# Patient Record
Sex: FEMALE
[De-identification: names, ages, dates, MRNs, and addresses within clinical notes are randomized per-mention and may not be internally consistent; named-entity substitution may affect disease eponyms.]

---

## 2017-03-30 PROBLEM — M75.102 ROTATOR CUFF SYNDROME, LEFT: Status: ACTIVE | Noted: 2017-03-30

## 2017-04-20 PROBLEM — M25.551 HIP PAIN, RIGHT: Status: ACTIVE | Noted: 2017-04-20

## 2017-06-19 PROBLEM — K44.9 HIATAL HERNIA: Status: ACTIVE | Noted: 2017-06-19

## 2017-06-19 PROBLEM — K21.9 GASTROESOPHAGEAL REFLUX DISEASE WITHOUT ESOPHAGITIS: Status: ACTIVE | Noted: 2017-06-19

## 2018-04-25 PROBLEM — M25.511 ACUTE PAIN OF RIGHT SHOULDER: Status: ACTIVE | Noted: 2018-04-25

## 2018-06-04 PROBLEM — IMO0002 DISORDER OF ROTATOR CUFF SYNDROME OF RIGHT SHOULDER AND ALLIED DISORDER: Status: ACTIVE | Noted: 2018-06-04

## 2018-07-23 PROBLEM — M50.30 DEGENERATION OF CERVICAL INTERVERTEBRAL DISC: Status: ACTIVE | Noted: 2018-07-23

## 2018-07-23 PROBLEM — M47.812 CERVICAL SPONDYLOSIS WITHOUT MYELOPATHY: Status: ACTIVE | Noted: 2018-07-23

## 2018-07-23 PROBLEM — M47.812 CERVICAL ARTHRITIS: Status: ACTIVE | Noted: 2018-07-23

## 2018-10-08 ENCOUNTER — HOSPITAL (OUTPATIENT)
Dept: OTHER | Age: 73
End: 2018-10-08
Attending: EMERGENCY MEDICINE

## 2020-07-30 PROBLEM — M16.11 PRIMARY OSTEOARTHRITIS OF RIGHT HIP: Status: ACTIVE | Noted: 2020-07-30

## 2020-12-23 PROBLEM — M81.0 AGE-RELATED OSTEOPOROSIS WITHOUT CURRENT PATHOLOGICAL FRACTURE: Status: ACTIVE | Noted: 2020-12-23

## 2020-12-23 NOTE — H&P (VIEW-ONLY)
Bianca Choi  [id number]  77year old    Izzy Heck is here for a bone health evaluation for secondary fracture prevention, osteoporosis and risk of future fractures. I have summarized her medical history, medications and fracture history.     Referred by: OSIEL She is unable to tolerate prolonged walking due to severe right hip pain. Her father suffered a vertebral fracture due to a fall later in life.      Context:  Recent Fracture:[x] denies [] admits   Previous fracture after the age of 49: [] denies [x] admits denies [] admits  Palpitations: [x] denies [] admits     Neuro HPI:  Numbness/tingling:  [x] denies [] admits      HPI:  Polyuria: [x] denies [] admits    Voice change:    The character of the voice change is: unchanged       Current Outpatient Medication Fatuma Lala MD;  Location: [address]  5/17    [facility]   • Hip replacement surgery  7/19/12    LEFT hip FREDY w/conversion to [facility] by Dr. Jessica Vogt   • Knee scope,med&lat menis shav Right 6/10/2015    Procedure: ARTHROSCOPY KNEE RIGHT; Performed by Brooklyn Chandler at [facility]   • Tonsillectomy        Social History:  Social History    Tobacco Use      Smoking status: Former Smoker        Packs/day: 1.00        Years: 50.00        Pack years: 50        Types: Cigarettes Value Date    TSH 1.580 10/20/2014     IMAGING   Most recent DEXA results:     12/07/2017  CENTRAL DEXA SCAN  CLINICAL INFORMATION: 20-year-old postmenopausal female for osteoporosis evaluation. Patient reports  history of left hip surgery.  The patient is loss, clinical significance of DEXA scans discussed with pt.  -although the patient's 2017 DEXA results reveal T-scores in the osteopenia range in the right wrist, right total hip, and right femoral neck, her history of low trauma left hip fracture indicat weight-bearing exercise recommendations after her right total hip replacement surgery is completed. Today we discussed:   · that bone strength is equal to bone quality and density.    · Greater than 50 minutes spent with the patient and over half of the

## 2021-01-05 PROBLEM — M81.0 AGE-RELATED OSTEOPOROSIS WITHOUT CURRENT PATHOLOGICAL FRACTURE: Status: RESOLVED | Noted: 2020-12-23 | Resolved: 2021-01-05

## 2021-01-12 ENCOUNTER — LAB ENCOUNTER (OUTPATIENT)
Dept: LAB | Facility: HOSPITAL | Age: 76
End: 2021-01-12
Attending: ORTHOPAEDIC SURGERY
Payer: MEDICARE

## 2021-01-12 DIAGNOSIS — Z01.818 PREOP TESTING: ICD-10-CM

## 2021-01-12 LAB
ANTIBODY SCREEN: NEGATIVE
RH BLOOD TYPE: POSITIVE

## 2021-01-12 PROCEDURE — 86850 RBC ANTIBODY SCREEN: CPT

## 2021-01-12 PROCEDURE — 36415 COLL VENOUS BLD VENIPUNCTURE: CPT

## 2021-01-12 PROCEDURE — 86900 BLOOD TYPING SEROLOGIC ABO: CPT

## 2021-01-12 PROCEDURE — 87641 MR-STAPH DNA AMP PROBE: CPT

## 2021-01-12 PROCEDURE — 86901 BLOOD TYPING SEROLOGIC RH(D): CPT

## 2021-01-13 LAB
MRSA DNA SPEC QL NAA+PROBE: NEGATIVE
SARS-COV-2 RNA RESP QL NAA+PROBE: NOT DETECTED

## 2021-01-15 ENCOUNTER — ANESTHESIA (OUTPATIENT)
Dept: SURGERY | Facility: HOSPITAL | Age: 76
End: 2021-01-15
Payer: MEDICARE

## 2021-01-15 ENCOUNTER — APPOINTMENT (OUTPATIENT)
Dept: GENERAL RADIOLOGY | Facility: HOSPITAL | Age: 76
End: 2021-01-15
Attending: ORTHOPAEDIC SURGERY
Payer: MEDICARE

## 2021-01-15 ENCOUNTER — HOSPITAL ENCOUNTER (OUTPATIENT)
Facility: HOSPITAL | Age: 76
Setting detail: HOSPITAL OUTPATIENT SURGERY
Discharge: HOME HEALTH | End: 2021-01-15
Attending: ORTHOPAEDIC SURGERY | Admitting: ORTHOPAEDIC SURGERY
Payer: MEDICARE

## 2021-01-15 ENCOUNTER — ANESTHESIA EVENT (OUTPATIENT)
Dept: SURGERY | Facility: HOSPITAL | Age: 76
End: 2021-01-15
Payer: MEDICARE

## 2021-01-15 VITALS
SYSTOLIC BLOOD PRESSURE: 105 MMHG | HEART RATE: 62 BPM | HEIGHT: 67 IN | WEIGHT: 197 LBS | BODY MASS INDEX: 30.92 KG/M2 | OXYGEN SATURATION: 98 % | RESPIRATION RATE: 18 BRPM | TEMPERATURE: 98 F | DIASTOLIC BLOOD PRESSURE: 76 MMHG

## 2021-01-15 DIAGNOSIS — M16.12 PRIMARY OSTEOARTHRITIS OF LEFT HIP: ICD-10-CM

## 2021-01-15 DIAGNOSIS — Z01.818 PREOP TESTING: Primary | ICD-10-CM

## 2021-01-15 PROCEDURE — 97535 SELF CARE MNGMENT TRAINING: CPT

## 2021-01-15 PROCEDURE — 73502 X-RAY EXAM HIP UNI 2-3 VIEWS: CPT | Performed by: ORTHOPAEDIC SURGERY

## 2021-01-15 PROCEDURE — 76000 FLUOROSCOPY <1 HR PHYS/QHP: CPT | Performed by: ORTHOPAEDIC SURGERY

## 2021-01-15 PROCEDURE — 97116 GAIT TRAINING THERAPY: CPT

## 2021-01-15 PROCEDURE — 88305 TISSUE EXAM BY PATHOLOGIST: CPT | Performed by: ORTHOPAEDIC SURGERY

## 2021-01-15 PROCEDURE — 97161 PT EVAL LOW COMPLEX 20 MIN: CPT

## 2021-01-15 PROCEDURE — 94010 BREATHING CAPACITY TEST: CPT | Performed by: ORTHOPAEDIC SURGERY

## 2021-01-15 PROCEDURE — 88311 DECALCIFY TISSUE: CPT | Performed by: ORTHOPAEDIC SURGERY

## 2021-01-15 PROCEDURE — 97166 OT EVAL MOD COMPLEX 45 MIN: CPT

## 2021-01-15 PROCEDURE — 0SR903Z REPLACEMENT OF RIGHT HIP JOINT WITH CERAMIC SYNTHETIC SUBSTITUTE, OPEN APPROACH: ICD-10-PCS | Performed by: ORTHOPAEDIC SURGERY

## 2021-01-15 DEVICE — ACTIS DUOFIX HIP PROSTHESIS (FEMORAL STEM 12/14 TAPER CEMENTLESS SIZE 6 STD COLLAR)  CE
Type: IMPLANTABLE DEVICE | Site: HIP | Status: FUNCTIONAL
Brand: ACTIS

## 2021-01-15 DEVICE — CERAMIC HEAD UPCHARGE: Type: IMPLANTABLE DEVICE | Status: FUNCTIONAL

## 2021-01-15 DEVICE — PINNACLE CANCELLOUS BONE SCREW 6.5MM X 25MM
Type: IMPLANTABLE DEVICE | Site: HIP | Status: FUNCTIONAL
Brand: PINNACLE

## 2021-01-15 DEVICE — TOTAL HIP W/POR CUP & COCR HD: Type: IMPLANTABLE DEVICE | Status: FUNCTIONAL

## 2021-01-15 DEVICE — PINNACLE GRIPTION ACETABULAR SHELL SECTOR 54MM OD
Type: IMPLANTABLE DEVICE | Site: HIP | Status: FUNCTIONAL
Brand: PINNACLE GRIPTION

## 2021-01-15 DEVICE — PINNACLE HIP SOLUTIONS ALTRX POLYETHYLENE ACETABULAR LINER +4 NEUTRAL 36MM ID 54MM OD
Type: IMPLANTABLE DEVICE | Site: HIP | Status: FUNCTIONAL
Brand: PINNACLE ALTRX

## 2021-01-15 DEVICE — PINNACLE CANCELLOUS BONE SCREW 6.5MM X 30MM
Type: IMPLANTABLE DEVICE | Site: HIP | Status: FUNCTIONAL
Brand: PINNACLE

## 2021-01-15 DEVICE — BIOLOX DELTA CERAMIC FEMORAL HEAD +5.0 36MM DIA 12/14 TAPER
Type: IMPLANTABLE DEVICE | Site: HIP | Status: FUNCTIONAL
Brand: BIOLOX DELTA

## 2021-01-15 RX ORDER — SCOLOPAMINE TRANSDERMAL SYSTEM 1 MG/1
1 PATCH, EXTENDED RELEASE TRANSDERMAL ONCE
Status: DISCONTINUED | OUTPATIENT
Start: 2021-01-15 | End: 2021-01-15

## 2021-01-15 RX ORDER — NALOXONE HYDROCHLORIDE 0.4 MG/ML
80 INJECTION, SOLUTION INTRAMUSCULAR; INTRAVENOUS; SUBCUTANEOUS AS NEEDED
Status: DISCONTINUED | OUTPATIENT
Start: 2021-01-15 | End: 2021-01-15

## 2021-01-15 RX ORDER — MORPHINE SULFATE 4 MG/ML
2 INJECTION, SOLUTION INTRAMUSCULAR; INTRAVENOUS EVERY 10 MIN PRN
Status: DISCONTINUED | OUTPATIENT
Start: 2021-01-15 | End: 2021-01-15

## 2021-01-15 RX ORDER — CEFAZOLIN SODIUM/WATER 2 G/20 ML
2 SYRINGE (ML) INTRAVENOUS ONCE
Status: COMPLETED | OUTPATIENT
Start: 2021-01-15 | End: 2021-01-15

## 2021-01-15 RX ORDER — BUPIVACAINE HYDROCHLORIDE 7.5 MG/ML
INJECTION, SOLUTION INTRASPINAL
Status: COMPLETED | OUTPATIENT
Start: 2021-01-15 | End: 2021-01-15

## 2021-01-15 RX ORDER — SODIUM PHOSPHATE, DIBASIC AND SODIUM PHOSPHATE, MONOBASIC 7; 19 G/133ML; G/133ML
1 ENEMA RECTAL ONCE AS NEEDED
Status: DISCONTINUED | OUTPATIENT
Start: 2021-01-15 | End: 2021-01-15

## 2021-01-15 RX ORDER — SENNOSIDES 8.6 MG
17.2 TABLET ORAL NIGHTLY
Status: DISCONTINUED | OUTPATIENT
Start: 2021-01-15 | End: 2021-01-15

## 2021-01-15 RX ORDER — SODIUM CHLORIDE, SODIUM LACTATE, POTASSIUM CHLORIDE, CALCIUM CHLORIDE 600; 310; 30; 20 MG/100ML; MG/100ML; MG/100ML; MG/100ML
INJECTION, SOLUTION INTRAVENOUS CONTINUOUS
Status: DISCONTINUED | OUTPATIENT
Start: 2021-01-15 | End: 2021-01-15

## 2021-01-15 RX ORDER — MORPHINE SULFATE 4 MG/ML
4 INJECTION, SOLUTION INTRAMUSCULAR; INTRAVENOUS EVERY 10 MIN PRN
Status: DISCONTINUED | OUTPATIENT
Start: 2021-01-15 | End: 2021-01-15

## 2021-01-15 RX ORDER — EPHEDRINE SULFATE 50 MG/ML
INJECTION INTRAVENOUS AS NEEDED
Status: DISCONTINUED | OUTPATIENT
Start: 2021-01-15 | End: 2021-01-15 | Stop reason: SURG

## 2021-01-15 RX ORDER — LIDOCAINE HYDROCHLORIDE 10 MG/ML
INJECTION, SOLUTION INFILTRATION; PERINEURAL
Status: COMPLETED | OUTPATIENT
Start: 2021-01-15 | End: 2021-01-15

## 2021-01-15 RX ORDER — FAMOTIDINE 20 MG/1
20 TABLET ORAL ONCE
Status: COMPLETED | OUTPATIENT
Start: 2021-01-15 | End: 2021-01-15

## 2021-01-15 RX ORDER — ONDANSETRON 2 MG/ML
4 INJECTION INTRAMUSCULAR; INTRAVENOUS ONCE AS NEEDED
Status: DISCONTINUED | OUTPATIENT
Start: 2021-01-15 | End: 2021-01-15

## 2021-01-15 RX ORDER — GLYCOPYRROLATE 0.2 MG/ML
INJECTION, SOLUTION INTRAMUSCULAR; INTRAVENOUS AS NEEDED
Status: DISCONTINUED | OUTPATIENT
Start: 2021-01-15 | End: 2021-01-15 | Stop reason: SURG

## 2021-01-15 RX ORDER — MORPHINE SULFATE 10 MG/ML
6 INJECTION, SOLUTION INTRAMUSCULAR; INTRAVENOUS EVERY 10 MIN PRN
Status: DISCONTINUED | OUTPATIENT
Start: 2021-01-15 | End: 2021-01-15

## 2021-01-15 RX ORDER — HYDROMORPHONE HYDROCHLORIDE 1 MG/ML
0.6 INJECTION, SOLUTION INTRAMUSCULAR; INTRAVENOUS; SUBCUTANEOUS EVERY 5 MIN PRN
Status: DISCONTINUED | OUTPATIENT
Start: 2021-01-15 | End: 2021-01-15

## 2021-01-15 RX ORDER — DOCUSATE SODIUM 100 MG/1
100 CAPSULE, LIQUID FILLED ORAL 2 TIMES DAILY
Status: DISCONTINUED | OUTPATIENT
Start: 2021-01-15 | End: 2021-01-15

## 2021-01-15 RX ORDER — POLYETHYLENE GLYCOL 3350 17 G/17G
17 POWDER, FOR SOLUTION ORAL DAILY PRN
Status: DISCONTINUED | OUTPATIENT
Start: 2021-01-15 | End: 2021-01-15

## 2021-01-15 RX ORDER — HYDROMORPHONE HYDROCHLORIDE 1 MG/ML
0.4 INJECTION, SOLUTION INTRAMUSCULAR; INTRAVENOUS; SUBCUTANEOUS EVERY 5 MIN PRN
Status: DISCONTINUED | OUTPATIENT
Start: 2021-01-15 | End: 2021-01-15

## 2021-01-15 RX ORDER — KETOROLAC TROMETHAMINE 15 MG/ML
15 INJECTION, SOLUTION INTRAMUSCULAR; INTRAVENOUS EVERY 6 HOURS PRN
Status: DISCONTINUED | OUTPATIENT
Start: 2021-01-15 | End: 2021-01-15

## 2021-01-15 RX ORDER — HYDROCODONE BITARTRATE AND ACETAMINOPHEN 5; 325 MG/1; MG/1
2 TABLET ORAL AS NEEDED
Status: DISCONTINUED | OUTPATIENT
Start: 2021-01-15 | End: 2021-01-15

## 2021-01-15 RX ORDER — HYDROCODONE BITARTRATE AND ACETAMINOPHEN 5; 325 MG/1; MG/1
1 TABLET ORAL EVERY 4 HOURS PRN
Status: DISCONTINUED | OUTPATIENT
Start: 2021-01-15 | End: 2021-01-15

## 2021-01-15 RX ORDER — DIPHENHYDRAMINE HYDROCHLORIDE 50 MG/ML
25 INJECTION INTRAMUSCULAR; INTRAVENOUS ONCE AS NEEDED
Status: DISCONTINUED | OUTPATIENT
Start: 2021-01-15 | End: 2021-01-15

## 2021-01-15 RX ORDER — HYDROMORPHONE HYDROCHLORIDE 1 MG/ML
0.2 INJECTION, SOLUTION INTRAMUSCULAR; INTRAVENOUS; SUBCUTANEOUS EVERY 5 MIN PRN
Status: DISCONTINUED | OUTPATIENT
Start: 2021-01-15 | End: 2021-01-15

## 2021-01-15 RX ORDER — ONDANSETRON 2 MG/ML
4 INJECTION INTRAMUSCULAR; INTRAVENOUS EVERY 4 HOURS PRN
Status: DISCONTINUED | OUTPATIENT
Start: 2021-01-15 | End: 2021-01-15

## 2021-01-15 RX ORDER — ACETAMINOPHEN 500 MG
1000 TABLET ORAL ONCE
Status: COMPLETED | OUTPATIENT
Start: 2021-01-15 | End: 2021-01-15

## 2021-01-15 RX ORDER — BISACODYL 10 MG
10 SUPPOSITORY, RECTAL RECTAL
Status: DISCONTINUED | OUTPATIENT
Start: 2021-01-15 | End: 2021-01-15

## 2021-01-15 RX ORDER — GABAPENTIN 600 MG/1
600 TABLET ORAL ONCE
Status: COMPLETED | OUTPATIENT
Start: 2021-01-15 | End: 2021-01-15

## 2021-01-15 RX ORDER — TRANEXAMIC ACID 10 MG/ML
1000 INJECTION, SOLUTION INTRAVENOUS ONCE
Status: DISCONTINUED | OUTPATIENT
Start: 2021-01-16 | End: 2021-01-15

## 2021-01-15 RX ORDER — HYDROCODONE BITARTRATE AND ACETAMINOPHEN 5; 325 MG/1; MG/1
1 TABLET ORAL AS NEEDED
Status: DISCONTINUED | OUTPATIENT
Start: 2021-01-15 | End: 2021-01-15

## 2021-01-15 RX ORDER — PHENYLEPHRINE HCL 10 MG/ML
VIAL (ML) INJECTION AS NEEDED
Status: DISCONTINUED | OUTPATIENT
Start: 2021-01-15 | End: 2021-01-15 | Stop reason: SURG

## 2021-01-15 RX ORDER — ACETAMINOPHEN 500 MG
1000 TABLET ORAL ONCE
Status: DISCONTINUED | OUTPATIENT
Start: 2021-01-15 | End: 2021-01-15

## 2021-01-15 RX ORDER — METOCLOPRAMIDE 10 MG/1
10 TABLET ORAL ONCE
Status: COMPLETED | OUTPATIENT
Start: 2021-01-15 | End: 2021-01-15

## 2021-01-15 RX ORDER — ACETAMINOPHEN 325 MG/1
650 TABLET ORAL EVERY 4 HOURS PRN
Status: DISCONTINUED | OUTPATIENT
Start: 2021-01-15 | End: 2021-01-15

## 2021-01-15 RX ORDER — PROCHLORPERAZINE EDISYLATE 5 MG/ML
10 INJECTION INTRAMUSCULAR; INTRAVENOUS EVERY 6 HOURS PRN
Status: DISCONTINUED | OUTPATIENT
Start: 2021-01-15 | End: 2021-01-15

## 2021-01-15 RX ORDER — CEFAZOLIN SODIUM/WATER 2 G/20 ML
2 SYRINGE (ML) INTRAVENOUS EVERY 8 HOURS
Status: DISCONTINUED | OUTPATIENT
Start: 2021-01-15 | End: 2021-01-15

## 2021-01-15 RX ORDER — HYDROCODONE BITARTRATE AND ACETAMINOPHEN 5; 325 MG/1; MG/1
2 TABLET ORAL EVERY 4 HOURS PRN
Status: DISCONTINUED | OUTPATIENT
Start: 2021-01-15 | End: 2021-01-15

## 2021-01-15 RX ORDER — ASPIRIN 325 MG
325 TABLET ORAL 2 TIMES DAILY
Status: DISCONTINUED | OUTPATIENT
Start: 2021-01-15 | End: 2021-01-15

## 2021-01-15 RX ADMIN — GLYCOPYRROLATE 0.4 MG: 0.2 INJECTION, SOLUTION INTRAMUSCULAR; INTRAVENOUS at 12:48:00

## 2021-01-15 RX ADMIN — PHENYLEPHRINE HCL 100 MCG: 10 MG/ML VIAL (ML) INJECTION at 11:43:00

## 2021-01-15 RX ADMIN — CEFAZOLIN SODIUM/WATER 2 G: 2 G/20 ML SYRINGE (ML) INTRAVENOUS at 11:10:00

## 2021-01-15 RX ADMIN — PHENYLEPHRINE HCL 100 MCG: 10 MG/ML VIAL (ML) INJECTION at 11:18:00

## 2021-01-15 RX ADMIN — EPHEDRINE SULFATE 5 MG: 50 INJECTION INTRAVENOUS at 11:36:00

## 2021-01-15 RX ADMIN — PHENYLEPHRINE HCL 100 MCG: 10 MG/ML VIAL (ML) INJECTION at 11:25:00

## 2021-01-15 RX ADMIN — LIDOCAINE HYDROCHLORIDE 5 ML: 10 INJECTION, SOLUTION INFILTRATION; PERINEURAL at 10:56:00

## 2021-01-15 RX ADMIN — PHENYLEPHRINE HCL 100 MCG: 10 MG/ML VIAL (ML) INJECTION at 11:12:00

## 2021-01-15 RX ADMIN — PHENYLEPHRINE HCL 100 MCG: 10 MG/ML VIAL (ML) INJECTION at 11:27:00

## 2021-01-15 RX ADMIN — EPHEDRINE SULFATE 5 MG: 50 INJECTION INTRAVENOUS at 11:39:00

## 2021-01-15 RX ADMIN — BUPIVACAINE HYDROCHLORIDE 1.6 ML: 7.5 INJECTION, SOLUTION INTRASPINAL at 10:56:00

## 2021-01-15 NOTE — ANESTHESIA PROCEDURE NOTES
Spinal Block    Date/Time: 1/15/2021 10:56 AM  Performed by: Ramu Sagastume MD  Authorized by: Keysha Ledbetter MD       General Information and Staff    Start Time:  1/15/2021 10:56 AM  End Time:  1/15/2021 11:02 AM  Anesthesiologist:  Thierno Cobos

## 2021-01-15 NOTE — INTERVAL H&P NOTE
Pre-op Diagnosis: Primary osteoarthritis of right hip [M16.12]    The above referenced H&P was reviewed by Ronda Glass DO on 1/15/2021, the patient was examined and no significant changes have occurred in the patient's condition since the H&P was

## 2021-01-15 NOTE — OCCUPATIONAL THERAPY NOTE
OCCUPATIONAL THERAPY EVALUATION - INPATIENT     [facility]  Evaluation Date: 1/15/2021  Type of Evaluation: Initial  Presenting Problem: (right HARJIT)    Physician Order: IP Consult to Occupational Therapy  Reason for Therapy: ADL/IADL Dy discharged from Occupational Therapy services. Please re-order if a new functional limitation presents during this admission.     OCCUPATIONAL THERAPY MEDICAL/SOCIAL HISTORY     Problem List  Active Problems:    Preop testing      Past Medical History  Past TONSILLECTOMY     • TOTAL HIP REPLACEMENT         HOME SITUATION  Type of Home: House  Home Layout: Two ONDKR(4+1+56)  Lives With: Spouse           Other Equipment: (reacher, RW, w/c, cane, crutches )          Drives: Yes       Stairs in Home: 4STE, 4 to s assist  Toilet Transfer: CGA  Shower Transfer: NT  Chair Transfer: CGA    Bedroom Mobility: CGA with use of RW    BALANCE ASSESSMENT  Static Sitting: good  Dynamic Sitting: good  Static Standing: fair-  Dynamic Standing: fair-    FUNCTIONAL ADL ASSESSMENT

## 2021-01-15 NOTE — PHYSICAL THERAPY NOTE
PHYSICAL THERAPY HIP EVALUATION - INPATIENT     [facility]  Evaluation Date: 1/15/2021  Type of Evaluation: Initial  Physician Order: PT Eval and Treat    Presenting Problem: R HARJIT anterior   Reason for Therapy: Mobility Dysfunction and D education provided for 24/7 family assist and importance of pt slowing down to maintain precautions.     Patient's current functional deficits include decreased activity tolerance, anterior hip precautions, post op pain, gait deficits, stair negotiation, ba Brenden   • COLONOSCOPY, POSSIBLE BIOPSY, POSSIBLE POLYPECTOMY 48722 N/A 11/24/2015    Performed by Cindi Hull MD at [address] [place] [address], POSSIBLE POLYPECTOMY 66314 N/A 12/22/2014    Performed by Susanne Kuo, components of treatment. AM-PAC '6-Clicks' INPATIENT SHORT FORM - BASIC MOBILITY  How much difficulty does the patient currently have. ..  -   Turning over in bed (including adjusting bedclothes, sheets and blankets)?: A Little   -   Sitting down on a Status    Goal #3 Patient is able to ambulate 300 feet with assistive device at assistance level: modified independent    Goal #3   Current Status    Goal #4 Patient will negotiate 4 stairs/one curb w/ assistive device and supervision   Goal #4   Current S

## 2021-01-15 NOTE — CM/SW NOTE
SW was notified the pt. Is a pre-ortho with [facility]. The pt. Will be discharging home from Same Day Surgery. Marycarmen RN with [facility] is aware and will follow up.      [phone]

## 2021-01-15 NOTE — BRIEF OP NOTE
Pre-Operative Diagnosis: Primary osteoarthritis of right hip [M16.12]     Post-Operative Diagnosis: Primary osteoarthritis of right hip [M16.12]      Procedure Performed:   Procedure(s):  RIGHT TOTAL HIP ARTHROPLASTY ANTERIOR APPROACH    Surgeon(s) and Rol

## 2021-01-15 NOTE — ANESTHESIA PREPROCEDURE EVALUATION
Anesthesia PreOp Note    HPI:     Pete Cardenas is a 77year old female who presents for preoperative consultation requested by: Isabela Clements DO    Date of Surgery: 1/15/2021    Procedure(s):  HIP TOTAL ANTERIOR APPROACH  Indication: Primary ost Date Noted: 07/10/2012      Solar lentigo         Date Noted: 07/10/2012      Xerosis cutis         Date Noted: 07/10/2012      Seborrheic dermatitis, unspecified         Date Noted: 07/10/2012      Tobacco abuse         Date Noted: 01/10/2011 • TOTAL HIP REPLACEMENT           •  Desonide 0.05 % External Cream, Apply 1-2 times a day to ears, nose, temples and elbows, Disp: 60 g, Rfl: 0, 1/13/2021    •  aspirin (ASPIRIN EC LOW DOSE) 81 MG Oral Tab EC, Take 1 tablet (81 MG) twice daily with food f • Cancer Cousin         throat     Social History    Socioeconomic History      Marital status:       Spouse name: Not on file      Number of children: Not on file      Years of education: Not on file      Highest education level: Not on file    Oc Lab Results   Component Value Date     12/23/2020    K 4.30 12/23/2020     12/23/2020    CO2 22.3 12/23/2020    BUN 15.0 12/23/2020    CREATSERUM 0.70 12/23/2020     12/23/2020    CA 9.6 12/23/2020     Lab Results   Component Value Date Plan Comments: Beverly Li is scheduled for spinal anesthesia. Denies history of coagulopathy, not on blood thinners, no previous back surgery, spina bifida or scoliosis. Platelets are 904.      I have informed Beverly Li of the risks of neuraxial an

## 2021-01-15 NOTE — OPERATIVE REPORT
DOS 1/15/2021    PREOPERATIVE DIAGNOSIS: RIGHT Hip Arthritis    POSTOPERATIVE DIAGNOSIS: Same    PROCEDURE: RIGHT Total Hip arthroplasty, Direct anterior approach    SURGEON: Marcell Keith.  Barbara Weber D.O.    ASSISTANT: Erinn Roland; Dana Pérez    As Manual separation of the fascia from the muscle was performed medially down to the hip capsule lateral to the rectus femoris muscle. The anterior circumflex vessels were identified and coagulated.  The capsule was identified and a Cobra retractor was place clean dry trunnion. The hip was then reduced into a clear acetabulum. Once the hip was reduced, excellent range of motion and stability were noted, External Rotation >80 degrees without posterior impingement.   Next, the joint was thoroughly irrigated wi

## 2021-01-15 NOTE — ANESTHESIA POSTPROCEDURE EVALUATION
Patient: Alize Giron    Procedure Summary     Date: 01/15/21 Room / Location: [facility] OR  / [facility] OR    Anesthesia Start: 9975 Anesthesia Stop:     Procedure: HIP TOTAL ANTERIOR APPROACH (Right Hip) Diagnosis:       Primary osteoarthritis of left

## 2021-01-21 NOTE — ANESTHESIA POSTPROCEDURE EVALUATION
Patient: Christopher Pitt    Procedure Summary     Date: 01/15/21 Room / Location: [facility] OR  / [facility] OR    Anesthesia Start: 6155 Anesthesia Stop: 0158    Procedure: HIP TOTAL ANTERIOR APPROACH (Right Hip) Diagnosis:       Primary osteoarthritis of l

## 2021-01-21 NOTE — ANESTHESIA POSTPROCEDURE EVALUATION
Patient: Aldo Christina    Procedure Summary     Date: 01/15/21 Room / Location: [facility] OR  / [facility] OR    Anesthesia Start: 0694 Anesthesia Stop: 4599    Procedure: HIP TOTAL ANTERIOR APPROACH (Right Hip) Diagnosis:       Primary osteoarthritis of l

## 2021-01-21 NOTE — ANESTHESIA POSTPROCEDURE EVALUATION
Patient: Bianca Choi    Procedure Summary     Date: 01/15/21 Room / Location: [facility] OR  / [facility] OR    Anesthesia Start: 8096 Anesthesia Stop: 8230    Procedure: HIP TOTAL ANTERIOR APPROACH (Right Hip) Diagnosis:       Primary osteoarthritis of l

## 2021-05-04 PROCEDURE — 88305 TISSUE EXAM BY PATHOLOGIST: CPT | Performed by: INTERNAL MEDICINE

## 2023-09-27 NOTE — ADDENDUM NOTE
Addendum  created 01/21/21 1310 by Jed Ceballos MD    Clinical Note Signed PATIENT INSTRUCTIONS    Treatment:    Permanent Nail (partial or whole) Removal Instructions      You have had a minor surgical procedure: some pain is to be expected.    You should take Tylenol, Advil, Aleve or whatever you would normally take for minor aches and pains.    You may have a bruise where the injection was given.    Some bleeding from the site is to be expected. If the bleeding soaks the bandage, you may remove the bandage early and reapply as needed, using gentle pressure on the area. If bleeding persists beyond 48 hours, please call our office at [phone].    Care Instructions:      Starting tonight, soak your toe/foot in warm water, with antibacterial soap mixed in, for 10-15 minutes, 2-3 times a day. Continue daily soaks for 10 days or until the drainage stops.  Remove the bandage while soaking or directly afterwards.  This will allow the bandage to be removed easily and with as little pain as possible.  Pat the area dry.    Apply a small amount of Neosporin and the non-adhesive bandage to the nail area.  Apply a band aid over the area. Cloth or fabric band aids work best because they are breathable. Do NOT use a plastic band aid because it will keep the area very moist and may slow the healing process.  Do NOT use peroxide or alcohol.     Redness and oozing are to be expected. This is often confused with infection. If you have increased pain after the first 5 days, please call our office.      A yellow drainage is to be expected after chemical nail procedures. This is perfectly normal, and may last as long as 2-6 weeks.      Some discoloration of the skin around the nail area at the cuticle is normal. It often appears as a red to purple color. This will fade over 4-8 weeks.     Follow-Up:  Please make an appointment with Dr. Donnie Velázquez in 3-4 weeks          Please note: 24 hour notice for cancellation of appointment is required.    You may receive a survey in the mail, or via the e-mail  address that you have provided.  We would appreciate if you could fill out the survey and provide us with any feedback on your experience regarding your visit today. Thank you for allowing us to provide you with your health care needs.     Do not hesitate to call if you are experiencing severe pain, worsening or change in your pain, have symptoms of infection (fever, warmth, redness, increased drainage), or have any other problem that concerns you ~ [phone] (or [phone] after hours).    Please remember when requesting refills on pain medication that the request should be made by Thursday at the latest. [facility] is open Monday-Friday, 8am-5pm, and closed on the weekends.  No narcotic refills will be filled after hours.    Additional Educational Resources:  For additional resources regarding your symptoms, diagnosis, or further health information, please visit the Health Resources section on [email or web address] or the Online Health Resources section in [employer].

## (undated) DEVICE — SUTURE MONOCRYL 3-0 Y936H

## (undated) DEVICE — GAUZE SPONGES,12 PLY: Brand: CURITY

## (undated) DEVICE — HOOD: Brand: FLYTE

## (undated) DEVICE — VIOLET BRAIDED (POLYGLACTIN 910), SYNTHETIC ABSORBABLE SUTURE: Brand: COATED VICRYL

## (undated) DEVICE — SOL  .9 1000ML BTL

## (undated) DEVICE — DRAPE SHEET LG

## (undated) DEVICE — UNDYED BRAIDED (POLYGLACTIN 910), SYNTHETIC ABSORBABLE SUTURE: Brand: COATED VICRYL

## (undated) DEVICE — 1010 S-DRAPE TOWEL DRAPE 10/BX: Brand: STERI-DRAPE™

## (undated) DEVICE — SYRINGE MNJCT 35ML LF STRL LL

## (undated) DEVICE — BIPOLAR SEALER 23-112-1 AQM 6.0: Brand: AQUAMANTYS™

## (undated) DEVICE — Device: Brand: STABLECUT®

## (undated) DEVICE — DEV STRATAFIX PDS + SUTR 0 CTX

## (undated) DEVICE — SUTURE VICRYL 2-0 CT-1

## (undated) DEVICE — DRESSING AQUACEL AG 3.5 X 10

## (undated) DEVICE — FAN SPRAY KIT: Brand: PULSAVAC®

## (undated) DEVICE — CONTAINER SPEC STR 4OZ GRY LID

## (undated) DEVICE — DRAPE SRG 26X15IN UTL TPE STRL

## (undated) DEVICE — SOL  .9 3000ML

## (undated) DEVICE — SUTURE VICRYL 2-0 FS-1

## (undated) DEVICE — 450 ML BOTTLE OF 0.05% CHLORHEXIDINE GLUCONATE IN 99.95% STERILE WATER FOR IRRIGATION, USP AND APPLICATOR.: Brand: IRRISEPT ANTIMICROBIAL WOUND LAVAGE

## (undated) DEVICE — WEBRIL COTTON UNDERCAST PADDING: Brand: WEBRIL

## (undated) DEVICE — PHOTONSABER Y METAL TIP

## (undated) DEVICE — GOWN SURGICAL MICROCOOL XL LNG

## (undated) DEVICE — 3M™ STERI-STRIP™ REINFORCED ADHESIVE SKIN CLOSURES, R1547, 1/2 IN X 4 IN (12 MM X 100 MM), 6 STRIPS/ENVELOPE: Brand: 3M™ STERI-STRIP™

## (undated) DEVICE — SPINOCAN® 18 GA. X 3-1/2 IN. (90 MM) SPINAL NEEDLE: Brand: SPINOCAN®

## (undated) DEVICE — GOWN SURG AERO BLUE PERF XLG

## (undated) DEVICE — SOL  .9 1000ML BAG

## (undated) DEVICE — DRAPE PACK CHEST & U BAR

## (undated) DEVICE — GOWN SRG XL IMPRV BRTHBL STRL

## (undated) DEVICE — CHLORAPREP 26ML APPLICATOR

## (undated) DEVICE — ANTERIOR HIP: Brand: MEDLINE INDUSTRIES, INC.

## (undated) DEVICE — WOUND RETRACTOR AND PROTECTOR: Brand: ALEXIS O WOUND PROTECTOR-RETRACTOR

## (undated) DEVICE — GAMMEX® PI HYBRID SIZE 9, STERILE POWDER-FREE SURGICAL GLOVE, POLYISOPRENE AND NEOPRENE BLEND: Brand: GAMMEX